# Patient Record
Sex: MALE | Race: WHITE | NOT HISPANIC OR LATINO | Employment: OTHER | URBAN - METROPOLITAN AREA
[De-identification: names, ages, dates, MRNs, and addresses within clinical notes are randomized per-mention and may not be internally consistent; named-entity substitution may affect disease eponyms.]

---

## 2018-05-21 ENCOUNTER — OFFICE VISIT (OUTPATIENT)
Dept: URGENT CARE | Facility: URGENT CARE | Age: 83
End: 2018-05-21
Payer: MEDICARE

## 2018-05-21 ENCOUNTER — ANCILLARY PROCEDURE (OUTPATIENT)
Dept: RADIOLOGY | Facility: URGENT CARE | Age: 83
End: 2018-05-21
Payer: MEDICARE

## 2018-05-21 VITALS
BODY MASS INDEX: 31.78 KG/M2 | WEIGHT: 227 LBS | RESPIRATION RATE: 18 BRPM | SYSTOLIC BLOOD PRESSURE: 137 MMHG | DIASTOLIC BLOOD PRESSURE: 96 MMHG | OXYGEN SATURATION: 96 % | TEMPERATURE: 97.5 F | HEART RATE: 88 BPM | HEIGHT: 71 IN

## 2018-05-21 DIAGNOSIS — M25.532 LEFT WRIST PAIN: Primary | ICD-10-CM

## 2018-05-21 PROCEDURE — 99202 OFFICE O/P NEW SF 15 MIN: CPT | Performed by: NURSE PRACTITIONER

## 2018-05-21 PROCEDURE — 73110 X-RAY EXAM OF WRIST: CPT | Mod: LT,PO,FY

## 2018-05-21 PROCEDURE — 99213 OFFICE O/P EST LOW 20 MIN: CPT | Mod: PO | Performed by: NURSE PRACTITIONER

## 2018-05-21 PROCEDURE — 99202 OFFICE O/P NEW SF 15 MIN: CPT | Mod: 25,PO | Performed by: NURSE PRACTITIONER

## 2018-05-21 RX ORDER — WARFARIN 3 MG/1
TABLET ORAL
COMMUNITY
Start: 2013-08-13

## 2018-05-21 RX ORDER — GABAPENTIN 800 MG/1
TABLET ORAL
COMMUNITY
Start: 2018-05-09

## 2018-05-21 RX ORDER — BENAZEPRIL HYDROCHLORIDE 20 MG/1
40 TABLET ORAL
COMMUNITY
Start: 2018-05-09

## 2018-05-21 RX ORDER — ACETAMINOPHEN 500 MG
TABLET ORAL
COMMUNITY

## 2018-05-21 RX ORDER — METOPROLOL SUCCINATE 50 MG/1
TABLET, EXTENDED RELEASE ORAL
COMMUNITY
Start: 2018-05-09

## 2018-05-21 RX ORDER — CHLORTHALIDONE 25 MG/1
TABLET ORAL
Status: ON HOLD | COMMUNITY
Start: 2018-05-09 | End: 2020-09-04 | Stop reason: ALTCHOICE

## 2018-05-21 RX ORDER — PRAVASTATIN SODIUM 40 MG/1
TABLET ORAL
COMMUNITY
Start: 2018-05-09 | End: 2018-12-11 | Stop reason: ALTCHOICE

## 2018-05-21 RX ORDER — ASPIRIN 81 MG/1
TABLET ORAL
COMMUNITY
Start: 2010-07-15

## 2018-05-21 RX ORDER — OXYBUTYNIN CHLORIDE 5 MG/1
5 TABLET, EXTENDED RELEASE ORAL
COMMUNITY

## 2018-05-21 RX ORDER — ATORVASTATIN CALCIUM 40 MG/1
40 TABLET, FILM COATED ORAL
COMMUNITY

## 2018-05-21 RX ORDER — CLOPIDOGREL BISULFATE 75 MG/1
75 TABLET ORAL
COMMUNITY
Start: 2013-12-20

## 2018-05-21 RX ORDER — POTASSIUM CHLORIDE 1500 MG/1
TABLET, EXTENDED RELEASE ORAL
COMMUNITY
Start: 2018-03-17

## 2018-05-21 ASSESSMENT — ENCOUNTER SYMPTOMS
DIZZINESS: 0
NUMBNESS: 1
CHILLS: 0
FEVER: 0
VOMITING: 0
NAUSEA: 0
WEAKNESS: 0
PALPITATIONS: 0
TREMORS: 1
SHORTNESS OF BREATH: 0

## 2018-05-21 ASSESSMENT — PAIN SCALES - GENERAL: PAINLEVEL: 9

## 2018-05-21 NOTE — PROGRESS NOTES
"Subjective      HPI    Milan Hill is a 83 y.o. male who presents for left wrist and hand pain.  Patient states he was getting up out of his chair fell back and landed on his left hand.  States he noticed swelling and pain immediately.  Denies any weakness      Review of Systems   Constitutional: Negative for chills and fever.   Respiratory: Negative for shortness of breath.    Cardiovascular: Negative for chest pain and palpitations.   Gastrointestinal: Negative for nausea and vomiting.   Musculoskeletal:        Tender and swelling to the left hand   Skin: Negative.    Neurological: Positive for tremors (familial tremors) and numbness (slight to 3rd and 4th left digit). Negative for dizziness and weakness.         Objective   /96 (BP Location: Right arm, Patient Position: Sitting, Cuff Size: Reg)   Pulse 88   Temp 36.4 °C (97.5 °F) (Temporal)   Resp 18   Ht 1.791 m (5' 10.5\")   Wt 103 kg (227 lb)   SpO2 96%   BMI 32.11 kg/m²     Physical Exam   Constitutional: He is oriented to person, place, and time. He appears well-developed and well-nourished. No distress.   HENT:   Head: Normocephalic and atraumatic.   Eyes: Pupils are equal, round, and reactive to light. No scleral icterus.   Neck: Normal range of motion.   Cardiovascular: Normal rate and intact distal pulses.    Pulmonary/Chest: Effort normal. No respiratory distress.   Musculoskeletal: Normal range of motion. He exhibits edema and tenderness (left hand over metacarpals/wrist ulnar and radial side). He exhibits no deformity.   Neurological: He is alert and oriented to person, place, and time. He has normal strength. No sensory deficit. He exhibits normal muscle tone.   Skin: Skin is warm and dry. Capillary refill takes less than 2 seconds. No bruising and no rash noted. He is not diaphoretic.   Psychiatric: He has a normal mood and affect. His behavior is normal. Judgment and thought content normal.   Nursing note and vitals " reviewed.      No results found for this or any previous visit (from the past 4 hour(s)).    Assessment/Plan   Diagnoses and all orders for this visit:    Left wrist pain  -     X-ray wrist 3 or more views left (Normal, Clinic Performed)    X-ray Wrist 3 Or More Views Left (normal, Clinic Performed)    Result Date: 5/21/2018  Exam: 3 views Left Wrist  05/21/2018 Clinical History: Left wrist pain; post injury-ttp and swelling 2-5 metacarpals  wrist: radial/ulna Procedure/Views: 3 views Left Wrist Comparison/s:  None Findings:  No acute fracture or dislocation. Moderate degenerative change of the first carpometacarpal joint. Possibly dorsal swelling.     1. Possible dorsal wrist swelling. No definite acute fracture. 2. 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      Discussion:     Discussed xray results as mentioned above show no fracture.  Encouraged patient to use rest, ice, compression, and elevation for pain and swelling.  Can continue to use NSAIDS and tylenol for discomfort.  Applied Velcro wrist brace to affected area.  Patient is to return to urgent care should they develop fever, worsening pain/symptoms, or any concerns.  Patient verbalized understanding and agrees with plan.  Patient denies any questions or concerns at this time.         Jennifer G Franke, CNP

## 2018-12-11 ENCOUNTER — OFFICE VISIT (OUTPATIENT)
Dept: URGENT CARE | Facility: URGENT CARE | Age: 83
End: 2018-12-11
Payer: MEDICARE

## 2018-12-11 VITALS
HEART RATE: 55 BPM | DIASTOLIC BLOOD PRESSURE: 64 MMHG | TEMPERATURE: 97 F | OXYGEN SATURATION: 98 % | HEIGHT: 71 IN | BODY MASS INDEX: 32.9 KG/M2 | RESPIRATION RATE: 16 BRPM | WEIGHT: 235 LBS | SYSTOLIC BLOOD PRESSURE: 132 MMHG

## 2018-12-11 DIAGNOSIS — K12.1 STOMATITIS: Primary | ICD-10-CM

## 2018-12-11 LAB
BASOPHILS # BLD AUTO: 0.01 10*3/UL
BASOPHILS NFR BLD AUTO: 0 % (ref 0–2)
EOSINOPHIL # BLD AUTO: 0.13 10*3/UL
EOSINOPHIL NFR BLD AUTO: 3 % (ref 0–5)
ERYTHROCYTE [DISTWIDTH] IN BLOOD BY AUTOMATED COUNT: 12.9 % (ref 11.6–14.8)
HCT VFR BLD AUTO: 43.7 % (ref 43.5–53.7)
HGB BLD-MCNC: 14.9 G/DL (ref 14.1–18.1)
LYMPHOCYTES # BLD AUTO: 1.12 10*3/UL
LYMPHOCYTES NFR BLD AUTO: 28 % (ref 13–40)
MCH RBC QN AUTO: 31.2 PG (ref 26–34)
MCHC RBC AUTO-ENTMCNC: 34.2 G/DL (ref 31–36)
MCV RBC AUTO: 91.2 FL (ref 80–100)
MONOCYTES # BLD AUTO: 0.49 10*3/UL
MONOCYTES NFR BLD AUTO: 12 % (ref 2–11)
NEUTROPHILS # BLD AUTO: 2.26 10*3/UL
NEUTROPHILS NFR BLD AUTO: 56 % (ref 47–80)
PLATELET # BLD AUTO: 178 10*3/UL (ref 140–440)
PMV BLD AUTO: 8.5 FL (ref 6.9–10.8)
RBC # BLD AUTO: 4.79 10*6/ΜL (ref 4.69–6.13)
WBC # BLD AUTO: 4 10*3/UL (ref 4.1–10.9)

## 2018-12-11 PROCEDURE — 36415 COLL VENOUS BLD VENIPUNCTURE: CPT | Mod: PO | Performed by: PHYSICIAN ASSISTANT

## 2018-12-11 PROCEDURE — G0463 HOSPITAL OUTPT CLINIC VISIT: HCPCS | Mod: PO | Performed by: PHYSICIAN ASSISTANT

## 2018-12-11 PROCEDURE — 85025 COMPLETE CBC W/AUTO DIFF WBC: CPT | Mod: PO | Performed by: PHYSICIAN ASSISTANT

## 2018-12-11 PROCEDURE — 99212 OFFICE O/P EST SF 10 MIN: CPT | Performed by: PHYSICIAN ASSISTANT

## 2018-12-11 ASSESSMENT — PAIN SCALES - GENERAL: PAINLEVEL: 0-NO PAIN

## 2018-12-11 NOTE — PROGRESS NOTES
"Subjective      Milan Hill is a 84 y.o. male who presents for burning in his mouth. Pt states that this has been going on approximately one month. Reports a burning sensation throughout his mouth including the tongue, sides of cheek, and back of mouth. Peroxide and spicy, hot foods make this worse. He had to change his toothpaste as it was cinnamon flavor and this caused worsening burning. He states that the burning comes and goes and is worse some days than others. He denies new medications. Does not use oral or inhaled corticosteroids. Denies any other significant symptoms. 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    The following have been reviewed and updated as appropriate in this visit:  Past Medical, Surgical and Social History    Allergies   Allergen Reactions   • Percocet [Oxycodone-Acetaminophen] Other (see comments)     \"I go crazy\"     Current Outpatient Medications   Medication Sig Dispense Refill   • aspirin (ECOTRIN LOW STRENGTH) 81 mg EC tablet Take by mouth.     • atorvastatin (LIPITOR) 40 mg tablet Take 40 mg by mouth.     • benazepril (LOTENSIN) 20 mg tablet      • chlorthalidone (HYGROTEN) 25 mg tablet      • cholecalciferol, vitamin D3, 2,000 unit capsule Take by mouth.     • clopidogrel (PLAVIX) 75 mg tablet Take 75 mg by mouth.     • gabapentin (NEURONTIN) 800 mg tablet      • metoprolol succinate XL (TOPROL-XL) 50 mg 24 hr tablet      • oxybutynin XL (DITROPAN-XL) 5 mg 24 hr tablet Take 5 mg by mouth.     • potassium chloride (K-TAB) 20 mEq CR tablet      • warfarin (COUMADIN) 3 mg tablet Take 3 mg on Mondays, Wednesdays and Fridays. Take 1.5 mg on Tuesday, Thursdays, Saturdays and Sundays     • magic mouthwash (maalox/lidocaine/diphenhydramine) Swish and spit 10 mL every 6 (six) hours as needed for stomatitis for up to 5 days 280 mL 0     No current facility-administered medications for this visit.        Review of Systems  As noted above in HPI.     Objective " "  /64 (BP Location: Right arm, Patient Position: Sitting, Cuff Size: Reg) Comment: Manual  Pulse 55   Temp 36.1 °C (97 °F) (Temporal)   Resp 16   Ht 1.791 m (5' 10.5\")   Wt 107 kg (235 lb)   SpO2 98%   BMI 33.24 kg/m²     Physical Exam   Constitutional: He is oriented to person, place, and time. He appears well-developed and well-nourished. No distress.   HENT:   Right Ear: Tympanic membrane, external ear and ear canal normal.   Left Ear: Tympanic membrane, external ear and ear canal normal.   Mouth/Throat: Mucous membranes are dry.   Mucosa of bilateral cheeks with scattered pinpoint areas of erythema. These areas are non-tender to palpation. Fissured tongue. No evidence of candidal infection.    Eyes: Conjunctivae and EOM are normal.   Cardiovascular: Normal rate, regular rhythm and normal heart sounds.   Pulmonary/Chest: Effort normal and breath sounds normal.   Neurological: He is alert and oriented to person, place, and time.   Skin: Skin is warm and dry.   Nursing note and vitals reviewed.      LABS  Recent Results (from the past 2 hour(s))   CBC w/auto differential Blood, Venous    Collection Time: 12/11/18 12:10 PM   Result Value Ref Range    WBC 4.0 (L) 4.1 - 10.9 10*3/uL    RBC 4.79 4.69 - 6.13 10*6/µL    Hemoglobin 14.9 14.1 - 18.1 g/dL    Hematocrit 43.7 43.5 - 53.7 %    MCV 91.2 80.0 - 100.0 fL    MCH 31.2 26.0 - 34.0 pg    MCHC 34.2 31.0 - 36.0 g/dL    RDW 12.9 11.6 - 14.8 %    Platelets 178 140 - 440 10*3/uL    MPV 8.5 6.9 - 10.8 fL    Neutrophils% 56 47 - 80 %    Lymphocytes% 28 13 - 40 %    Monocytes% 12 (H) 2 - 11 %    Eosinophils% 3 0 - 5 %    Basophils% 0 0 - 2 %    Neutrophils Absolute 2.26 10*3/uL    Lymphocytes Absolute 1.12 10*3/uL    Monocytes Absolute 0.49 10*3/uL    Eosinophils Absolute 0.13 10*3/uL    Basophils Absolute 0.01 10*3/uL         Assessment/Plan   Diagnoses and all orders for this visit:    Stomatitis  -     CBC w/auto differential Blood, Venous  -     magic " mouthwash (maalox/lidocaine/diphenhydramine); Swish and spit 10 mL every 6 (six) hours as needed for stomatitis for up to 5 days        I did obtain a CBC to look for anemia. Hgb and Hct WNL. Exam not consistent with herpangina. Question possible xerostomia as cause. Will prescribe magic mouthwash to see if this helps patients discomfort. Advised use of OTC agents to help increase salivation and moisture within the mouth. Follow up with PCP in the next 1 week if symptoms persist for further evaluation. Return to the clinic prn with any worsening or new concerning symptoms.   Patient expresses understanding and agrees with plan of care.     FRANK Lockhart  December 11, 2018 12:38 PM

## 2019-01-22 ENCOUNTER — HOSPITAL ENCOUNTER (OUTPATIENT)
Dept: NUCLEAR MEDICINE | Facility: HOSPITAL | Age: 84
Discharge: HOME | End: 2019-01-22
Payer: MEDICARE

## 2019-01-22 VITALS — WEIGHT: 235 LBS | BODY MASS INDEX: 33.24 KG/M2

## 2019-01-22 DIAGNOSIS — R10.9 ABDOMINAL PAIN: ICD-10-CM

## 2019-01-22 PROCEDURE — 6360000200 HC RX 636 W HCPCS (ALT 250 FOR IP): Performed by: RADIOLOGY

## 2019-01-22 PROCEDURE — A9537 TC99M MEBROFENIN: HCPCS

## 2019-01-22 PROCEDURE — 2580000300 HC RX 258: Performed by: RADIOLOGY

## 2019-01-22 RX ADMIN — SODIUM CHLORIDE 2.14 MCG: 9 INJECTION, SOLUTION INTRAVENOUS at 12:55

## 2019-04-08 ENCOUNTER — ANCILLARY PROCEDURE (OUTPATIENT)
Dept: RADIOLOGY | Facility: URGENT CARE | Age: 84
End: 2019-04-08
Payer: MEDICARE

## 2019-04-08 ENCOUNTER — OFFICE VISIT (OUTPATIENT)
Dept: URGENT CARE | Facility: URGENT CARE | Age: 84
End: 2019-04-08
Payer: MEDICARE

## 2019-04-08 VITALS
HEART RATE: 66 BPM | HEIGHT: 71 IN | RESPIRATION RATE: 20 BRPM | WEIGHT: 239 LBS | OXYGEN SATURATION: 95 % | BODY MASS INDEX: 33.46 KG/M2 | TEMPERATURE: 97.4 F | DIASTOLIC BLOOD PRESSURE: 66 MMHG | SYSTOLIC BLOOD PRESSURE: 118 MMHG

## 2019-04-08 DIAGNOSIS — M79.672 LEFT FOOT PAIN: Primary | ICD-10-CM

## 2019-04-08 DIAGNOSIS — M25.572 ACUTE LEFT ANKLE PAIN: ICD-10-CM

## 2019-04-08 PROCEDURE — 73610 X-RAY EXAM OF ANKLE: CPT | Mod: LT,PO,FY

## 2019-04-08 PROCEDURE — G0463 HOSPITAL OUTPT CLINIC VISIT: HCPCS | Mod: PO | Performed by: NURSE PRACTITIONER

## 2019-04-08 PROCEDURE — 99213 OFFICE O/P EST LOW 20 MIN: CPT | Performed by: NURSE PRACTITIONER

## 2019-04-08 PROCEDURE — 73630 X-RAY EXAM OF FOOT: CPT | Mod: LT,PO,FY

## 2019-04-08 RX ORDER — KETOCONAZOLE 20 MG/ML
SHAMPOO, SUSPENSION TOPICAL
COMMUNITY
Start: 2019-01-04

## 2019-04-08 RX ORDER — OMEPRAZOLE 20 MG/1
CAPSULE, DELAYED RELEASE ORAL
COMMUNITY
Start: 2019-03-08 | End: 2019-04-08 | Stop reason: ALTCHOICE

## 2019-04-08 RX ORDER — KETOCONAZOLE 20 MG/G
CREAM TOPICAL
COMMUNITY
Start: 2019-01-04

## 2019-04-08 ASSESSMENT — ENCOUNTER SYMPTOMS
SINUS PRESSURE: 0
FEVER: 0
SORE THROAT: 0
SINUS PAIN: 0
SHORTNESS OF BREATH: 0
RHINORRHEA: 0
WHEEZING: 0
COUGH: 0
PALPITATIONS: 0
CHILLS: 0

## 2019-04-08 ASSESSMENT — PAIN SCALES - GENERAL: PAINLEVEL: 2

## 2019-04-08 NOTE — PROGRESS NOTES
"Subjective      HPI    Milan Hill is a 84 y.o. male who presents for evaluation of left lateral foot discomfort.  States that it started approximately a week ago.  He has noticed mild swelling to the left mid lateral aspect of his foot and of the ankle which is generalized.  He states pain with walking is 10 out of 10 with rest 2 out of 10.  He denies any injury or trauma to the ankle or foot.  States over the last week or so he has been helping his daughter with a remodeling job in her house.  States that he has been walking up and down fair amount of steps.  He does have chronic peripheral neuropathy has been ongoing for the last 8-10 years.  Denies any personal history of gout.  He does report past history of fracture, several years ago.  Patient does have known peripheral arterial disease and has had stents to bilateral lower extremities, this is been done at the [facility].  States he just had angioplasty done in October 2008 to the left lower extremity.  Does have a known history of A. fib, CHF, hypertension -he is on Coumadin for anticoagulation, beta-blockers and lisinopril.  He denies history of diabetes.      Review of Systems   Constitutional: Negative for chills and fever.   HENT: Negative for ear pain, rhinorrhea, sinus pressure, sinus pain and sore throat.    Respiratory: Negative for cough, shortness of breath and wheezing.    Cardiovascular: Negative for chest pain and palpitations.   Musculoskeletal:        Left mid/lateral foot pain with associated swelling, generalized swelling left ankle.       As noted in HPI.      Objective   /66   Pulse 66   Temp 36.3 °C (97.4 °F)   Resp 20   Ht 1.791 m (5' 10.51\")   Wt 108 kg (239 lb)   SpO2 95%   BMI 33.80 kg/m²     Physical Exam   Constitutional: He is oriented to person, place, and time. He appears well-developed and well-nourished. No distress.   HENT:   Head: Normocephalic.   Right Ear: External ear normal.   Left Ear: External " ear normal.   Mouth/Throat: Oropharynx is clear and moist. No oropharyngeal exudate.   Eyes: Pupils are equal, round, and reactive to light. Conjunctivae and EOM are normal.   Neck: Normal range of motion. Neck supple.   Cardiovascular: Exam reveals no gallop and no friction rub.   No murmur heard.  Slightly irregular rhythm, rate upper 60/lower 70s.  Not able to appreciate murmur, click, rub or gallop.   Pulmonary/Chest: Effort normal and breath sounds normal. No respiratory distress. He has no wheezes. He has no rales. He exhibits no tenderness.   Abdominal: Soft. Bowel sounds are normal. There is no tenderness.   Musculoskeletal:   Left lower extremity: Mild generalized swelling of the ankle and foot, more so mid/lateral foot.  Nontender to palpation over [id number] shaft tibia/fibula; no palpable anterior ankle joint tenderness, crepitus.  Moderate palpable tenderness over mid/proximal aspect of third and fourth metatarsals, nontender over first, second, fifth metatarsals.  Application of tuning fork over mid/proximal third and fourth metatarsal with increased sensitivity compared to remaining metatarsals.  Patient does have some mild decreased sensation to touch.  Pedal pulse 1+/4+.  Skin is warm.  Capillary refill 2-3 seconds.  He is able to ankle dorsiflex and plantarflex without difficulty.  Calf supple-nontender.   Lymphadenopathy:     He has no cervical adenopathy.   Neurological: He is alert and oriented to person, place, and time.   Skin: Skin is warm and dry. Capillary refill takes less than 2 seconds.   Psychiatric: He has a normal mood and affect.   Nursing note and vitals reviewed.      No results found for this or any previous visit (from the past 4 hour(s)).    X-ray Ankle 3 Or More Views Left    Result Date: 4/8/2019  Exam:  Left foot and ankle 6 views total 04/08/2019 Clinical History: Left foot pain; left foot pain - prox to mid shaft 3rd and 4th MTs ? early stress fx. Comparison:  None  Findings: Peroneus brevis enthesopathy. Plantar calcaneal enthesopathy. Dorsal spurring of the mid foot. No acute displaced fracture or dislocation. Remodeled appearance of the medial malleolus compatible with old trauma. No acute displaced fracture is seen. There is hardware at the distal third diaphysis of the tibia, which is incompletely imaged. Moderate soft tissue swelling around the ankle.     Impression: 1.  No acute displaced fracture or dislocation. Moderate soft tissue swelling around the ankle.    X-ray Foot 3 Or More Views Left    Result Date: 4/8/2019  Exam:  Left foot and ankle 6 views total 04/08/2019 Clinical History: Left foot pain; left foot pain - prox to mid shaft 3rd and 4th MTs ? early stress fx. Comparison:  None Findings: Peroneus brevis enthesopathy. Plantar calcaneal enthesopathy. Dorsal spurring of the mid foot. No acute displaced fracture or dislocation. Remodeled appearance of the medial malleolus compatible with old trauma. No acute displaced fracture is seen. There is hardware at the distal third diaphysis of the tibia, which is incompletely imaged. Moderate soft tissue swelling around the ankle.     Impression: 1.  No acute displaced fracture or dislocation. Moderate soft tissue swelling around the ankle.        Assessment/Plan   Diagnoses and all orders for this visit:    Left foot pain  -     X-ray foot 3 or more views left  -     Ambulatory referral to Podiatry; Future    Acute left ankle pain  -     X-ray ankle 3 or more views left        Discussion:   Reviewed exam findings with patient.  Imaging of the left foot and left ankle are obtained.  Imaging does not show any evidence of gross fractures.  However, on exam does have moderate palpable tenderness over the mid/proximal aspect of the third and fourth metatarsals and with increased sensitivity of application of tuning fork over the area in comparison to the remaining metatarsals.  Concern for possible stress fracture.  Patient  denies any history of osteoporosis.  At this time, patient will be placed into a cam boot, states that he has one at home, as well as crutches.  Discussed with him to wear the cam boot with all ambulation he is weightbearing as tolerated.  Elevation and ice to the area as needed for pain/discomfort.  Referral to podiatry for further evaluation and management.  Patient verbalizes understanding and agrees with treatment plan.       Yeni Colindres, CNP

## 2019-04-15 ENCOUNTER — HOSPITAL ENCOUNTER (OUTPATIENT)
Facility: HOSPITAL | Age: 84
Setting detail: OUTPATIENT SURGERY
Discharge: HOME | End: 2019-04-15
Attending: INTERNAL MEDICINE | Admitting: INTERNAL MEDICINE
Payer: MEDICARE

## 2019-04-15 VITALS
WEIGHT: 230 LBS | BODY MASS INDEX: 31.15 KG/M2 | OXYGEN SATURATION: 97 % | SYSTOLIC BLOOD PRESSURE: 128 MMHG | TEMPERATURE: 97.2 F | DIASTOLIC BLOOD PRESSURE: 77 MMHG | HEIGHT: 72 IN | RESPIRATION RATE: 16 BRPM | HEART RATE: 56 BPM

## 2019-04-15 PROCEDURE — (BLANK) HC RECOVERY PHASE-2 1ST 1/2 HOUR ACUITY LEVEL 1: Performed by: INTERNAL MEDICINE

## 2019-04-15 PROCEDURE — (BLANK): Performed by: INTERNAL MEDICINE

## 2019-04-15 RX ORDER — LIDOCAINE HYDROCHLORIDE 20 MG/ML
SOLUTION OROPHARYNGEAL
Status: DISCONTINUED
Start: 2019-04-15 | End: 2019-04-15 | Stop reason: HOSPADM

## 2020-09-01 RX ORDER — ALLOPURINOL 300 MG/1
300 TABLET ORAL DAILY
COMMUNITY

## 2020-09-01 RX ORDER — DULOXETIN HYDROCHLORIDE 30 MG/1
30 CAPSULE, DELAYED RELEASE ORAL DAILY
COMMUNITY

## 2020-09-01 RX ORDER — PREDNISONE 5 MG/1
5 TABLET ORAL EVERY OTHER DAY
COMMUNITY

## 2020-09-01 RX ORDER — OMEPRAZOLE 20 MG/1
20 CAPSULE, DELAYED RELEASE ORAL DAILY
COMMUNITY

## 2020-09-01 RX ORDER — FUROSEMIDE 20 MG/1
20 TABLET ORAL DAILY
COMMUNITY

## 2020-09-01 SDOH — HEALTH STABILITY: MENTAL HEALTH: HOW OFTEN DO YOU HAVE A DRINK CONTAINING ALCOHOL?: MONTHLY OR LESS

## 2020-09-01 SDOH — HEALTH STABILITY: MENTAL HEALTH: HOW OFTEN DO YOU HAVE SIX OR MORE DRINKS ON ONE OCCASION?: NEVER

## 2020-09-01 SDOH — HEALTH STABILITY: MENTAL HEALTH: HOW MANY DRINKS CONTAINING ALCOHOL DO YOU HAVE ON A TYPICAL DAY WHEN YOU ARE DRINKING?: 1 OR 2

## 2020-09-04 ENCOUNTER — HOSPITAL ENCOUNTER (OUTPATIENT)
Facility: HOSPITAL | Age: 85
Setting detail: OUTPATIENT SURGERY
Discharge: HOME | End: 2020-09-04
Attending: INTERNAL MEDICINE | Admitting: INTERNAL MEDICINE
Payer: MEDICARE

## 2020-09-04 ENCOUNTER — ANESTHESIA (OUTPATIENT)
Dept: GASTROENTEROLOGY | Facility: HOSPITAL | Age: 85
End: 2020-09-04
Payer: MEDICARE

## 2020-09-04 ENCOUNTER — ANESTHESIA EVENT (OUTPATIENT)
Dept: GASTROENTEROLOGY | Facility: HOSPITAL | Age: 85
End: 2020-09-04
Payer: MEDICARE

## 2020-09-04 VITALS
RESPIRATION RATE: 12 BRPM | SYSTOLIC BLOOD PRESSURE: 122 MMHG | HEART RATE: 51 BPM | BODY MASS INDEX: 30.14 KG/M2 | OXYGEN SATURATION: 98 % | TEMPERATURE: 97.5 F | WEIGHT: 222.22 LBS | DIASTOLIC BLOOD PRESSURE: 67 MMHG

## 2020-09-04 PROCEDURE — (BLANK): Performed by: INTERNAL MEDICINE

## 2020-09-04 PROCEDURE — 99100 ANES PT EXTEME AGE<1 YR&>70: CPT | Performed by: NURSE ANESTHETIST, CERTIFIED REGISTERED

## 2020-09-04 PROCEDURE — 6360000200 HC RX 636 W HCPCS (ALT 250 FOR IP): Performed by: NURSE ANESTHETIST, CERTIFIED REGISTERED

## 2020-09-04 PROCEDURE — (BLANK) HC MAC ANESTHESIA FACILITY CHARGE EACH ADDITIONAL MIN: Performed by: INTERNAL MEDICINE

## 2020-09-04 PROCEDURE — (BLANK) HC RECOVERY PHASE-2 EACH ADDITIONAL 1/2 HOUR ACUITY LEVEL 1: Performed by: INTERNAL MEDICINE

## 2020-09-04 PROCEDURE — (BLANK) HC MAC ANESTHESIA FACILITY CHARGE 1ST 15 MIN: Performed by: INTERNAL MEDICINE

## 2020-09-04 PROCEDURE — 88305 TISSUE EXAM BY PATHOLOGIST: CPT

## 2020-09-04 PROCEDURE — 2580000300 HC RX 258: Performed by: NURSE ANESTHETIST, CERTIFIED REGISTERED

## 2020-09-04 PROCEDURE — (BLANK) HC RECOVERY PHASE-2 1ST 1/2 HOUR ACUITY LEVEL 1: Performed by: INTERNAL MEDICINE

## 2020-09-04 PROCEDURE — 2500000200 HC RX 250 WO HCPCS: Performed by: NURSE ANESTHETIST, CERTIFIED REGISTERED

## 2020-09-04 PROCEDURE — 00813 ANES UPR LWR GI NDSC PX: CPT | Performed by: NURSE ANESTHETIST, CERTIFIED REGISTERED

## 2020-09-04 RX ORDER — PROPOFOL 10 MG/ML
INJECTION, EMULSION INTRAVENOUS AS NEEDED
Status: DISCONTINUED | OUTPATIENT
Start: 2020-09-04 | End: 2020-09-04 | Stop reason: SURG

## 2020-09-04 RX ORDER — TOPICAL ANESTHETIC 200 MG/ML
SPRAY DENTAL; PERIODONTAL AS NEEDED
Status: DISCONTINUED | OUTPATIENT
Start: 2020-09-04 | End: 2020-09-04 | Stop reason: SURG

## 2020-09-04 RX ORDER — LIDOCAINE HYDROCHLORIDE 20 MG/ML
INJECTION, SOLUTION EPIDURAL; INFILTRATION; INTRACAUDAL; PERINEURAL AS NEEDED
Status: DISCONTINUED | OUTPATIENT
Start: 2020-09-04 | End: 2020-09-04 | Stop reason: SURG

## 2020-09-04 RX ORDER — PROPOFOL 10 MG/ML
INJECTION, EMULSION INTRAVENOUS CONTINUOUS PRN
Status: DISCONTINUED | OUTPATIENT
Start: 2020-09-04 | End: 2020-09-04 | Stop reason: SURG

## 2020-09-04 RX ORDER — SODIUM CHLORIDE, SODIUM LACTATE, POTASSIUM CHLORIDE, CALCIUM CHLORIDE 600; 310; 30; 20 MG/100ML; MG/100ML; MG/100ML; MG/100ML
INJECTION, SOLUTION INTRAVENOUS CONTINUOUS PRN
Status: DISCONTINUED | OUTPATIENT
Start: 2020-09-04 | End: 2020-09-04 | Stop reason: SURG

## 2020-09-04 RX ADMIN — SODIUM CHLORIDE, POTASSIUM CHLORIDE, SODIUM LACTATE AND CALCIUM CHLORIDE: 600; 310; 30; 20 INJECTION, SOLUTION INTRAVENOUS at 08:59

## 2020-09-04 RX ADMIN — TOPICAL ANESTHETIC 1 APPLICATION: 200 SPRAY DENTAL; PERIODONTAL at 09:03

## 2020-09-04 RX ADMIN — PROPOFOL 20 MG: 10 INJECTION, EMULSION INTRAVENOUS at 09:06

## 2020-09-04 RX ADMIN — PROPOFOL 20 MG: 10 INJECTION, EMULSION INTRAVENOUS at 09:08

## 2020-09-04 RX ADMIN — LIDOCAINE HYDROCHLORIDE 100 MG: 20 INJECTION, SOLUTION EPIDURAL; INFILTRATION; INTRACAUDAL; PERINEURAL at 09:06

## 2020-09-04 RX ADMIN — DEXMEDETOMIDINE HYDROCHLORIDE 8 MCG: 4 INJECTION, SOLUTION INTRAVENOUS at 09:06

## 2020-09-04 RX ADMIN — PROPOFOL 150 MCG/KG/MIN: 10 INJECTION, EMULSION INTRAVENOUS at 09:06

## 2020-09-04 ASSESSMENT — ENCOUNTER SYMPTOMS: DYSRHYTHMIAS: 1

## 2020-09-04 NOTE — PERIOPERATIVE NURSING NOTE
Discharge instructions was explained to the pt and his son in-law, all their questions were answered and they verbalized understanding.

## 2020-09-04 NOTE — H&P
HPI  HPI   This very pleasant 85-year-old man presents for surveillance colonoscopy after undergoing colonoscopy in 2008 and [facility] and being advised to follow-up with colonoscopy in 2013.  However, that did not take place.  He also presents for esophagogastroduodenoscopy due to dysphagia.    There is no problem list on file for this patient.    Past Medical History:   Diagnosis Date   • Arthritis    • Cancer (CMS/HCC) (HCC)     skin cancer   • Clotting disorder (CMS/HCC) (HCC)     on anticoagulants   • Coronary artery disease    • Dysrhythmia    • Familial tremor    • Hiatal hernia    • Hypercholesteremia    • Hypertension    • Irregular heart beat    • Peripheral vascular disease (CMS/HCC) (HCC)    • Urinary retention    • Wears dentures    • Wears partial dentures      Prior to Admission medications    Medication Sig Start Date End Date Taking? Authorizing Provider   allopurinoL (ZYLOPRIM) 300 mg tablet Take 300 mg by mouth daily   Yes Historical Provider, MD   omeprazole (PriLOSEC) 20 mg capsule Take 20 mg by mouth daily   Yes Historical Provider, MD   predniSONE 5 mg tablet Take 5 mg by mouth every other day   Yes Historical Provider, MD   furosemide (LASIX) 20 mg tablet Take 20 mg by mouth daily   Yes Historical Provider, MD   DULoxetine (CYMBALTA) 30 mg capsule Take 30 mg by mouth daily   Yes Historical Provider, MD   ketoconazole (NIZORAL) 2 % cream  1/4/19  Yes Historical Provider, MD   ketoconazole (NIZORAL) 2 % shampoo  1/4/19  Yes Historical Provider, MD   aspirin (ECOTRIN LOW STRENGTH) 81 mg EC tablet Take by mouth. 7/15/10  Yes Historical Provider, MD   atorvastatin (LIPITOR) 40 mg tablet Take 40 mg by mouth.   Yes Historical Provider, MD   benazepril (LOTENSIN) 20 mg tablet 40 mg   5/9/18  Yes Historical Provider, MD   cholecalciferol, vitamin D3, 2,000 unit capsule Take by mouth.   Yes Historical Provider, MD   gabapentin (NEURONTIN) 800 mg tablet  5/9/18  Yes Historical Provider, MD   metoprolol  "succinate XL (TOPROL-XL) 50 mg 24 hr tablet  5/9/18  Yes Historical Provider, MD   oxybutynin XL (DITROPAN-XL) 5 mg 24 hr tablet Take 5 mg by mouth.   Yes Historical Provider, MD   clopidogrel (PLAVIX) 75 mg tablet Take 75 mg by mouth. 12/20/13   Historical Provider, MD   potassium chloride (K-TAB) 20 mEq CR tablet  3/17/18   Historical Provider, MD   warfarin (COUMADIN) 3 mg tablet Take 3 mg on Mondays, Wednesdays and Fridays. Take 1.5 mg on Tuesday, Thursdays, Saturdays and Sundays 8/13/13   Historical Provider, MD     Allergies   Allergen Reactions   • Percocet [Oxycodone-Acetaminophen] Other (see comments)     \"I go crazy\"     Social History     Tobacco Use   • Smoking status: Former Smoker     Packs/day: 1.00     Years: 40.00     Pack years: 40.00     Types: Cigarettes   • Smokeless tobacco: Former User     Types: Chew   Substance Use Topics   • Alcohol use: Yes     Frequency: Monthly or less     Drinks per session: 1 or 2     Binge frequency: Never     Family History   Problem Relation Age of Onset   • Heart disease Father      Physical Exam  Well-developed well-nourished man who appears his stated age of 85 years.  Comfortable at rest on room air.  Alert and oriented.  Anicteric.  Mallampati class II pharynx.  Chest is clear to auscultation bilaterally.  Cardiac examination reveals irregularly irregular rhythm consistent with atrial fibrillation.  Normal S1-S2.  No S3-S4.  No murmurs clicks or rubs audible.  Impression/Plan   85-year-old man who presents for esophagogastroduodenoscopy due to dysphagia and surveillance colonoscopy due to history of colon polyps.  Risks benefits indications and alternatives to colonoscopy and esophagogastroduodenoscopy with esophageal dilatation were reviewed in detail with patient informed consent was obtained for the procedures.  Anesthesia Evaluation   The patient is an appropriate candidate for the planned procedure with monitored anesthesia care administered by CRNA.  "

## 2020-09-04 NOTE — ANESTHESIA POSTPROCEDURE EVALUATION
Patient: Milan Hill    Procedure Summary     Date: 09/04/20 Room / Location: [facility] ENDO 03 / [facility]    Anesthesia Start: 0859 Anesthesia Stop: 0959    Procedures:       ESOPHAGOGASTRODUODENOSCOPY with esophageal dilation (N/A Esophagus)      COLONOSCOPY with polypectomy (N/A Anus) Diagnosis:       Dysphagia, unspecified type      (EGD: Dysphagia)      (Colon: Pancolonic diverticulosis, polyp)    Provider: Destin Haro MD Responsible Provider: Destin Haro MD    Anesthesia Type: MAC ASA Status: 3          Anesthesia Type: MAC    Last vitals  Vitals Value Taken Time   BP     Temp     Pulse     Resp     SpO2     Pain Score         Anesthesia Post Evaluation    Patient location during evaluation: bedside  Patient participation: complete - patient participated  Level of consciousness: awake and alert  Pain management: adequate  Airway patency: patent  Anesthetic complications: no  Cardiovascular status: acceptable and hemodynamically stable  Respiratory status: acceptable  Hydration status: acceptable  May dismiss recovered patient based on consultation with the appropriate physicians and/or meeting appropriate discharge criteria      Cosmetic?

## 2020-09-04 NOTE — ANESTHESIA PREPROCEDURE EVALUATION
Pre-Procedure Assessment    Patient: Milan Hill, male, 85 y.o.    Ht Readings from Last 1 Encounters:   04/15/19 1.829 m (6')     Wt Readings from Last 1 Encounters:   04/15/19 104.3 kg (230 lb)       Last Vitals  BP      Temp      Pulse     Resp      SpO2      Pain Score         Problem list reviewed and Medical history reviewed           Airway   Mallampati: I  TM distance: >3 FB  Neck ROM: full      Dental      Pulmonary - negative ROS    breath sounds clear to auscultation  Cardiovascular   (+) hypertension, CAD, dysrhythmias, PVD,     Rhythm: regular  Rate: normal  ROS comment: history of A. fib, CHF, hypertension     Mental Status/Neuro/Psych    Pt is alert.      (+) arthritis,     GI/Hepatic/Renal    (+) hiatal hernia, urinary retention    Comments: Dysphagia, previous dilation    Endo/Other    (+) history of cancer, clotting disorder,   Abdominal           Social History     Tobacco Use   • Smoking status: Former Smoker     Packs/day: 1.00     Years: 40.00     Pack years: 40.00     Types: Cigarettes   • Smokeless tobacco: Former User     Types: Chew   Substance Use Topics   • Alcohol use: Yes     Frequency: Monthly or less     Drinks per session: 1 or 2     Binge frequency: Never      Hematology   WBC   Date Value Ref Range Status   12/11/2018 4.0 (L) 4.1 - 10.9 10*3/uL Final     RBC   Date Value Ref Range Status   12/11/2018 4.79 4.69 - 6.13 10*6/µL Final     MCV   Date Value Ref Range Status   12/11/2018 91.2 80.0 - 100.0 fL Final     Hemoglobin   Date Value Ref Range Status   12/11/2018 14.9 14.1 - 18.1 g/dL Final     Hematocrit   Date Value Ref Range Status   12/11/2018 43.7 43.5 - 53.7 % Final     Platelets   Date Value Ref Range Status   12/11/2018 178 140 - 440 10*3/uL Final      Coagulation No results found for: PT, APTT, INR   General Chemistry No results found for: POCGLU, CALCIUM, BUN, CREATININE, GLUCOSE, NA, K, MG, CO2, CL, DIGOXIN  Anesthesia Plan    ASA 3   NPO status reviewed: > 8  hours    MAC         Induction: intravenous                 Anesthetic plan and risks discussed with patient.  Use of blood products discussed with patient who consented to blood products.     Plan discussed with attending.

## 2020-11-22 ENCOUNTER — APPOINTMENT (OUTPATIENT)
Dept: CT IMAGING | Facility: HOSPITAL | Age: 85
End: 2020-11-22
Payer: MEDICARE

## 2020-11-22 ENCOUNTER — HOSPITAL ENCOUNTER (EMERGENCY)
Facility: HOSPITAL | Age: 85
Discharge: HOME | End: 2020-11-22
Attending: FAMILY MEDICINE
Payer: MEDICARE

## 2020-11-22 VITALS
DIASTOLIC BLOOD PRESSURE: 90 MMHG | RESPIRATION RATE: 16 BRPM | TEMPERATURE: 97.9 F | WEIGHT: 222 LBS | SYSTOLIC BLOOD PRESSURE: 150 MMHG | OXYGEN SATURATION: 95 % | HEIGHT: 72 IN | HEART RATE: 86 BPM | BODY MASS INDEX: 30.07 KG/M2

## 2020-11-22 DIAGNOSIS — U07.1 COVID-19: ICD-10-CM

## 2020-11-22 DIAGNOSIS — U07.1 COVID-19: Primary | ICD-10-CM

## 2020-11-22 LAB
ALBUMIN SERPL-MCNC: 4.1 G/DL (ref 3.5–5.3)
ALP SERPL-CCNC: 83 U/L (ref 45–115)
ALT SERPL-CCNC: 13 U/L (ref 7–52)
ANION GAP SERPL CALC-SCNC: 11 MMOL/L (ref 3–11)
AST SERPL-CCNC: 16 U/L
BACTERIA #/AREA URNS HPF: NORMAL /HPF
BASOPHILS # BLD AUTO: 0 10*3/UL
BASOPHILS NFR BLD AUTO: 1 % (ref 0–2)
BILIRUB SERPL-MCNC: 1.51 MG/DL (ref 0.2–1.4)
BILIRUB UR QL: ABNORMAL
BUN SERPL-MCNC: 14 MG/DL (ref 7–25)
CALCIUM ALBUM COR SERPL-MCNC: 8.7 MG/DL (ref 8.6–10.3)
CALCIUM SERPL-MCNC: 8.8 MG/DL (ref 8.6–10.3)
CHLORIDE SERPL-SCNC: 102 MMOL/L (ref 98–107)
CLARITY UR: CLEAR
CO2 SERPL-SCNC: 21 MMOL/L (ref 21–32)
COLOR UR: YELLOW
CREAT SERPL-MCNC: 1.29 MG/DL (ref 0.7–1.3)
EOSINOPHIL # BLD AUTO: 0 10*3/UL
EOSINOPHIL NFR BLD AUTO: 0 % (ref 0–3)
ERYTHROCYTE [DISTWIDTH] IN BLOOD BY AUTOMATED COUNT: 15.3 % (ref 11.5–15)
GFR SERPL CREATININE-BSD FRML MDRD: 50 ML/MIN/1.73M*2
GLUCOSE SERPL-MCNC: 164 MG/DL (ref 70–105)
GLUCOSE UR QL: NEGATIVE MG/DL
HCT VFR BLD AUTO: 40.5 % (ref 38–50)
HGB BLD-MCNC: 13.4 G/DL (ref 13.2–17.2)
HGB UR QL: NEGATIVE
KETONES UR-MCNC: NEGATIVE MG/DL
LEUKOCYTE ESTERASE UR QL STRIP: NEGATIVE
LIPASE SERPL-CCNC: 13 U/L (ref 11–82)
LYMPHOCYTES # BLD AUTO: 0.3 10*3/UL
LYMPHOCYTES NFR BLD AUTO: 8 % (ref 15–47)
MCH RBC QN AUTO: 30.9 PG (ref 29–34)
MCHC RBC AUTO-ENTMCNC: 33.2 G/DL (ref 32–36)
MCV RBC AUTO: 93.2 FL (ref 82–97)
MONOCYTES # BLD AUTO: 0.6 10*3/UL
MONOCYTES NFR BLD AUTO: 15 % (ref 5–13)
NEUTROPHILS # BLD AUTO: 2.9 10*3/UL
NEUTROPHILS NFR BLD AUTO: 75 % (ref 46–70)
NITRITE UR QL: NEGATIVE
PH UR: 5.5 PH
PLATELET # BLD AUTO: 184 10*3/UL (ref 130–350)
PMV BLD AUTO: 8.8 FL (ref 6.9–10.8)
POTASSIUM SERPL-SCNC: 3.7 MMOL/L (ref 3.5–5.1)
PROT SERPL-MCNC: 7.4 G/DL (ref 6–8.3)
PROT UR STRIP-MCNC: 30 MG/DL
RBC # BLD AUTO: 4.34 10*6/ΜL (ref 4.1–5.8)
RBC #/AREA URNS HPF: NORMAL /HPF
SARS-COV-2 RDRP RESP QL NAA+PROBE: POSITIVE
SODIUM SERPL-SCNC: 134 MMOL/L (ref 135–145)
SP GR UR: 1.01 (ref 1–1.03)
SQUAMOUS #/AREA URNS HPF: NORMAL /HPF
UROBILINOGEN UR-MCNC: 2 E.U./DL
WBC # BLD AUTO: 3.9 10*3/UL (ref 3.7–9.6)
WBC #/AREA URNS HPF: NORMAL /HPF

## 2020-11-22 PROCEDURE — 93010 ELECTROCARDIOGRAM REPORT: CPT | Performed by: FAMILY MEDICINE

## 2020-11-22 PROCEDURE — 87635 SARS-COV-2 COVID-19 AMP PRB: CPT | Performed by: FAMILY MEDICINE

## 2020-11-22 PROCEDURE — 81001 URINALYSIS AUTO W/SCOPE: CPT | Performed by: FAMILY MEDICINE

## 2020-11-22 PROCEDURE — 99284 EMERGENCY DEPT VISIT MOD MDM: CPT | Mod: CS | Performed by: FAMILY MEDICINE

## 2020-11-22 PROCEDURE — 70450 CT HEAD/BRAIN W/O DYE: CPT | Mod: MG

## 2020-11-22 PROCEDURE — 36415 COLL VENOUS BLD VENIPUNCTURE: CPT | Performed by: FAMILY MEDICINE

## 2020-11-22 PROCEDURE — 93005 ELECTROCARDIOGRAM TRACING: CPT | Performed by: FAMILY MEDICINE

## 2020-11-22 PROCEDURE — C9803 HOPD COVID-19 SPEC COLLECT: HCPCS | Performed by: FAMILY MEDICINE

## 2020-11-22 PROCEDURE — 85025 COMPLETE CBC W/AUTO DIFF WBC: CPT | Performed by: FAMILY MEDICINE

## 2020-11-22 PROCEDURE — 83690 ASSAY OF LIPASE: CPT | Performed by: FAMILY MEDICINE

## 2020-11-22 PROCEDURE — 99284 EMERGENCY DEPT VISIT MOD MDM: CPT | Mod: 25 | Performed by: FAMILY MEDICINE

## 2020-11-22 PROCEDURE — 80053 COMPREHEN METABOLIC PANEL: CPT | Performed by: FAMILY MEDICINE

## 2020-11-22 RX ORDER — AMLODIPINE BESYLATE 10 MG/1
10 TABLET ORAL DAILY
COMMUNITY

## 2020-11-22 ASSESSMENT — ENCOUNTER SYMPTOMS
COUGH: 0
NECK PAIN: 0
SHORTNESS OF BREATH: 0
ABDOMINAL PAIN: 0
NUMBNESS: 0
HEADACHES: 0
PALPITATIONS: 0
DYSURIA: 0
CHILLS: 0
VOMITING: 0
WEAKNESS: 0
BACK PAIN: 0
SPEECH DIFFICULTY: 0
FEVER: 0
FREQUENCY: 1
CONFUSION: 1
DIARRHEA: 0

## 2020-11-22 NOTE — DISCHARGE INSTRUCTIONS
Rest.  Please follow-up with primary care physician if symptoms persist and return back to the emergency room if symptoms worsen.

## 2020-11-22 NOTE — ED PROVIDER NOTES
HPI:  Chief Complaint   Patient presents with   • Altered Mental Status     patient woke up this morning feeling confused and feels like he couldn't get his thoughts straight. patient reports he is currently feeling better.   • Urinary Frequency     patient reports he was up all night having to urinate. patient denies pain with urination or abdominal pain       The patient is a very pleasant 86-year-old male brought in by his daughter for evaluation of confusion.  The patient reports he was trying to find a TV show today and was having difficulty which he is never had difficulty before in the past prompting his daughter to bring emergency room for further evaluation.  The patient reports he had a little bit of unsteady gait last night however this is resolved.  Patient reports he normally urinates approximately 7-8 times per night however urinated approximately 15 times which is unusual.  He denies any dysuria or urgency.  Patient denies abdominal pain.  No vomiting diarrhea.  No visual changes.  No slurred speech.  No weakness or numbness of upper or lower extremities.  Patient denies any chest pain, shortness of breath or cough.  No palpitations.          HISTORY:  Past Medical History:   Diagnosis Date   • Arthritis    • Cancer (CMS/HCC) (HCC)     skin cancer   • Clotting disorder (CMS/HCC) (HCC)     on anticoagulants   • Coronary artery disease    • Dysrhythmia    • Familial tremor    • Hiatal hernia    • Hypercholesteremia    • Hypertension    • Irregular heart beat    • Peripheral vascular disease (CMS/HCC) (HCC)    • Urinary retention    • Wears dentures    • Wears partial dentures        Past Surgical History:   Procedure Laterality Date   • COLONOSCOPY N/A 9/4/2020    Procedure: COLONOSCOPY with polypectomy;  Surgeon: Destin Haro MD;  Location: [facility];  Service: Endoscopy;  Laterality: N/A;   • DENTAL SURGERY     • ESOPHAGEAL MOTILITY STUDY N/A 4/15/2019    Procedure: ESOPHAGEAL MOTILITY  STUDY/MANOMETRY;  Surgeon: Destin Haro MD;  Location: [facility];  Service: Endoscopy;  Laterality: N/A;   • ESOPHAGOGASTRODUODENOSCOPY N/A 9/4/2020    Procedure: ESOPHAGOGASTRODUODENOSCOPY with esophageal dilation;  Surgeon: Destin Haro MD;  Location: [facility];  Service: Endoscopy;  Laterality: N/A;   • JOINT REPLACEMENT Right     knee   • KNEE SURGERY     • LEG SURGERY         Family History   Problem Relation Age of Onset   • Heart disease Father        Social History     Tobacco Use   • Smoking status: Former Smoker     Packs/day: 1.00     Years: 40.00     Pack years: 40.00     Types: Cigarettes   • Smokeless tobacco: Former User     Types: Chew   Substance Use Topics   • Alcohol use: Yes     Frequency: Monthly or less     Drinks per session: 1 or 2     Binge frequency: Never   • Drug use: Never         ROS:  Review of Systems   Constitutional: Negative for chills and fever.   Eyes: Negative for visual disturbance.   Respiratory: Negative for cough and shortness of breath.    Cardiovascular: Negative for chest pain and palpitations.   Gastrointestinal: Negative for abdominal pain, diarrhea and vomiting.   Genitourinary: Positive for frequency. Negative for dysuria.   Musculoskeletal: Negative for back pain and neck pain.   Skin: Negative for rash.   Neurological: Negative for speech difficulty, weakness, numbness and headaches.   Psychiatric/Behavioral: Positive for confusion.       PE:  ED Triage Vitals [11/22/20 1134]   Temp Heart Rate Resp BP SpO2   36.6 °C (97.9 °F) 86 16 150/90 90 %      Temp Source Heart Rate Source Patient Position BP Location FiO2 (%)   Temporal -- -- -- --       Physical Exam  Vitals signs reviewed.   Constitutional:       Appearance: He is well-developed.   HENT:      Head: Normocephalic and atraumatic.   Eyes:      General: No visual field deficit.  Cardiovascular:      Rate and Rhythm: Normal rate and regular rhythm.   Pulmonary:      Effort: Pulmonary effort is normal.       Breath sounds: Normal breath sounds.   Abdominal:      Tenderness: There is no abdominal tenderness.   Skin:     Findings: No rash.   Neurological:      Mental Status: He is alert and oriented to person, place, and time.      GCS: GCS eye subscore is 4. GCS verbal subscore is 5. GCS motor subscore is 6.      Cranial Nerves: No cranial nerve deficit, dysarthria or facial asymmetry.      Sensory: No sensory deficit.      Motor: No weakness.      Coordination: Coordination normal.      Gait: Gait normal.   Psychiatric:         Mood and Affect: Mood normal.         Behavior: Behavior normal.         ED LABS:  Labs Reviewed   COVID-19 MOLECULAR (PCR) - Abnormal       Result Value    COVID-19 PCR Positive (*)     Narrative:     Positive results are indicative of the presence of SARS-CoV-2 RNA; clinical correlation with patient history and other diagnostic information is necessary to determine patient infection status.    Performance of this SARS-CoV-2 RNA test has only been established in individuals suspected of having COVID-19 by their healthcare provider.    Testing was performed using the ID NOW COVID-19 assay ([employer]) that detects the SARS coronavirus 2 RdRp gene utilizing isothermal nucleic acid amplification.   Fact sheets for this Emergency Use Authorization (EUA) assay can be found at the following links:  For Healthcare Providers  https://www.fda.gov/media/556211/download  For Patients  https://www.fda.gov/media/712559/download   URINALYSIS DIPSTICK REFLEX TO CULTURE FOR USE WITH MICROSCOPIC PANEL - Abnormal    Color, Urine Yellow      Clarity, Urine Clear      pH, Urine 5.5      Specific Gravity, Urine 1.015      Protein, Urine 30  (*)     Glucose, Urine Negative      Ketones, Urine Negative      Blood, Urine Negative      Nitrite, Urine Negative      Bilirubin, Urine Small (*)     Leukocytes, Urine Negative      Urobilinogen, Urine 2.0 (*)     Narrative:     CONFIRMATORY URINE TESTING (ICTOTEST) UNAVAILABLE,  RECOMMEND SERUM BILIRUBIN IF INDICATED.   CBC WITH AUTO DIFFERENTIAL - Abnormal    WBC 3.9      RBC 4.34      Hemoglobin 13.4      Hematocrit 40.5      MCV 93.2      MCH 30.9      MCHC 33.2      RDW 15.3 (*)     Platelets 184      MPV 8.8      Neutrophils% 75 (*)     Lymphocytes% 8 (*)     Monocytes% 15 (*)     Eosinophils% 0      Basophils% 1      Neutrophils Absolute 2.90      Lymphocytes Absolute 0.30      Monocytes Absolute 0.60      Eosinophils Absolute 0.00      Basophils Absolute 0.00     COMPREHENSIVE METABOLIC PANEL - Abnormal    Sodium 134 (*)     Potassium 3.7      Chloride 102      CO2 21      Anion Gap 11      BUN 14      Creatinine 1.29      Glucose 164 (*)     Calcium 8.8      AST 16      ALT (SGPT) 13      Alkaline Phosphatase 83      Total Protein 7.4      Albumin 4.1      Total Bilirubin 1.51 (*)     eGFR 50 (*)     Corrected Calcium 8.7      Narrative:     Estimated GFR calculated using the 2009 CKD-EPI creatinine equation.   URINALYSIS MICROSCOPIC, REFLEX CULTURE - Normal    RBC, Urine 0-2      WBC, Urine None seen      Squamous Epithelial, Urine None seen      Bacteria, Urine None seen     LIPASE - Normal    Lipase 13     URINALYSIS WITH MICROSCOPIC, REFLEX CULTURE    Narrative:     The following orders were created for panel order Urinalysis w/microscopic, reflex culture Urine, Clean Catch.  Procedure                               Abnormality         Status                     ---------                               -----------         ------                     Urinalysis, Dip (part 1 o...[[id number]]  Abnormal            Final result               Urinalysis, Micro (part 2...[[id number]]  Normal              Final result                 Please view results for these tests on the individual orders.         ED IMAGES:  CT head without IV contrast    (Results Pending)       ED PROCEDURES:  Procedures    ED COURSE:   The patient is a very pleasant 86-year-old male who presents emergency room with his  daughter for evaluation of confusion.  Patient has normal white blood cell count of 3.9 and normal hemoglobin of 13.4.  Sodium is slightly low 134.  Glucose slightly elevated 164.  Urinalysis is unremarkable.  EKG did show atrial fibrillation with a rate of 63.  I am unable to compare this to prior EKGs as it is not in the system however the patient states he does have this type arrhythmia.  Covid was positive.  CT scan of the head -chronic age-related changes without evidence of acute intracranial pathology was noted.  This information was passed on the patient.  Patient remained stable here in the emergency room and oxygen saturations were 92% or greater here during his stay.  Patient will be discharged back home with Covid  entered in epic and will follow up with his primary care physician if needed.         MDM:  MDM  Number of Diagnoses or Management Options     Amount and/or Complexity of Data Reviewed  Clinical lab tests: reviewed and ordered  Tests in the radiology section of CPT®: ordered and reviewed  Tests in the medicine section of CPT®: reviewed and ordered  Review and summarize past medical records: yes    Risk of Complications, Morbidity, and/or Mortality  Presenting problems: moderate  Diagnostic procedures: high  Management options: moderate    Patient Progress  Patient progress: stable      Final diagnoses:   [U07.1] COVID-19   [U07.1] COVID-19        Choco Page MD  11/22/20 0525

## 2020-11-23 ENCOUNTER — HOME MONITORING (OUTPATIENT)
Dept: FAMILY MEDICINE | Facility: CLINIC | Age: 85
End: 2020-11-23

## 2020-11-23 PROCEDURE — G1004 CDSM NDSC: HCPCS | Performed by: RADIOLOGY

## 2020-11-23 PROCEDURE — 70450 CT HEAD/BRAIN W/O DYE: CPT | Mod: 26,MG | Performed by: RADIOLOGY

## 2020-11-23 NOTE — PROGRESS NOTES
Called patient and offer Covid Care Companion, patient feels like he is doing fine and has good family support.  Declined need of furthing monitoring.

## 2021-02-03 ENCOUNTER — CLINICAL SUPPORT (OUTPATIENT)
Dept: FAMILY MEDICINE | Facility: CLINIC | Age: 86
End: 2021-02-03

## 2021-02-03 DIAGNOSIS — Z23 ENCOUNTER FOR VACCINATION: Primary | ICD-10-CM

## 2021-02-24 ENCOUNTER — CLINICAL SUPPORT (OUTPATIENT)
Dept: FAMILY MEDICINE | Facility: CLINIC | Age: 86
End: 2021-02-24

## 2021-02-24 DIAGNOSIS — Z23 ENCOUNTER FOR VACCINATION: Primary | ICD-10-CM

## 2021-10-06 ENCOUNTER — CLINICAL SUPPORT (OUTPATIENT)
Dept: FAMILY MEDICINE | Facility: CLINIC | Age: 86
End: 2021-10-06
Payer: MEDICARE

## 2021-10-06 DIAGNOSIS — Z23 ENCOUNTER FOR VACCINATION: Primary | ICD-10-CM

## 2021-10-06 PROCEDURE — 91300 PFIZER-BIONTECH SARS-COV-2 VACCINE: CPT | Mod: PO | Performed by: NURSE PRACTITIONER

## 2023-01-01 ENCOUNTER — OFFICE VISIT (OUTPATIENT)
Dept: URGENT CARE | Facility: URGENT CARE | Age: 88
End: 2023-01-01
Payer: MEDICARE

## 2023-01-01 ENCOUNTER — ANCILLARY PROCEDURE (OUTPATIENT)
Dept: RADIOLOGY | Facility: URGENT CARE | Age: 88
End: 2023-01-01
Payer: MEDICARE

## 2023-01-01 VITALS
HEART RATE: 55 BPM | OXYGEN SATURATION: 94 % | HEIGHT: 72 IN | WEIGHT: 196.5 LBS | DIASTOLIC BLOOD PRESSURE: 58 MMHG | TEMPERATURE: 98 F | SYSTOLIC BLOOD PRESSURE: 102 MMHG | RESPIRATION RATE: 16 BRPM | BODY MASS INDEX: 26.61 KG/M2

## 2023-01-01 DIAGNOSIS — I50.43 CHF (CONGESTIVE HEART FAILURE), NYHA CLASS I, ACUTE ON CHRONIC, COMBINED (CMS/HCC): ICD-10-CM

## 2023-01-01 DIAGNOSIS — R05.9 COUGH: ICD-10-CM

## 2023-01-01 DIAGNOSIS — I95.9 HYPOTENSION, UNSPECIFIED HYPOTENSION TYPE: ICD-10-CM

## 2023-01-01 DIAGNOSIS — R07.89 CHEST WALL PAIN: Primary | ICD-10-CM

## 2023-01-01 LAB
ACTIVATED PARTIAL THROMBOPLASTIN TIME IN PPP BY COAGULATION ASSAY: 30 SEC (ref 26–39)
ALANINE AMINOTRANSFERASE (SGPT) (U/L) IN SER/PLAS: 6 U/L (ref 10–52)
ALBUMIN (G/DL) IN SER/PLAS: 3.8 G/DL (ref 3.4–5)
ALBUMIN (G/DL) IN SER/PLAS: 3.9 G/DL (ref 3.4–5)
ALKALINE PHOSPHATASE (U/L) IN SER/PLAS: 92 U/L (ref 33–136)
ANION GAP IN SER/PLAS: 11 MMOL/L (ref 10–20)
ANION GAP IN SER/PLAS: 12 MMOL/L (ref 10–20)
ASPARTATE AMINOTRANSFERASE (SGOT) (U/L) IN SER/PLAS: 13 U/L (ref 9–39)
BILIRUBIN TOTAL (MG/DL) IN SER/PLAS: 1.2 MG/DL (ref 0–1.2)
CALCIUM (MG/DL) IN SER/PLAS: 8.8 MG/DL (ref 8.6–10.6)
CALCIUM (MG/DL) IN SER/PLAS: 9 MG/DL (ref 8.6–10.6)
CARBON DIOXIDE, TOTAL (MMOL/L) IN SER/PLAS: 27 MMOL/L (ref 21–32)
CARBON DIOXIDE, TOTAL (MMOL/L) IN SER/PLAS: 30 MMOL/L (ref 21–32)
CHLORIDE (MMOL/L) IN SER/PLAS: 103 MMOL/L (ref 98–107)
CHLORIDE (MMOL/L) IN SER/PLAS: 106 MMOL/L (ref 98–107)
CREATININE (MG/DL) IN SER/PLAS: 1.18 MG/DL (ref 0.5–1.3)
CREATININE (MG/DL) IN SER/PLAS: 1.29 MG/DL (ref 0.5–1.3)
ERYTHROCYTE DISTRIBUTION WIDTH (RATIO) BY AUTOMATED COUNT: 19.9 % (ref 11.5–14.5)
ERYTHROCYTE DISTRIBUTION WIDTH (RATIO) BY AUTOMATED COUNT: 20.3 % (ref 11.5–14.5)
ERYTHROCYTE MEAN CORPUSCULAR HEMOGLOBIN CONCENTRATION (G/DL) BY AUTOMATED: 28.7 G/DL (ref 32–36)
ERYTHROCYTE MEAN CORPUSCULAR HEMOGLOBIN CONCENTRATION (G/DL) BY AUTOMATED: 29 G/DL (ref 32–36)
ERYTHROCYTE MEAN CORPUSCULAR VOLUME (FL) BY AUTOMATED COUNT: 84 FL (ref 80–100)
ERYTHROCYTE MEAN CORPUSCULAR VOLUME (FL) BY AUTOMATED COUNT: 91 FL (ref 80–100)
ERYTHROCYTES (10*6/UL) IN BLOOD BY AUTOMATED COUNT: 3.42 X10E12/L (ref 4.5–5.9)
ERYTHROCYTES (10*6/UL) IN BLOOD BY AUTOMATED COUNT: 4.56 X10E12/L (ref 4.5–5.9)
GFR MALE: 53 ML/MIN/1.73M2
GFR MALE: 60 ML/MIN/1.73M2
GLUCOSE (MG/DL) IN SER/PLAS: 116 MG/DL (ref 74–99)
GLUCOSE (MG/DL) IN SER/PLAS: 86 MG/DL (ref 74–99)
HEMATOCRIT (%) IN BLOOD BY AUTOMATED COUNT: 31 % (ref 41–52)
HEMATOCRIT (%) IN BLOOD BY AUTOMATED COUNT: 38.3 % (ref 41–52)
HEMOGLOBIN (G/DL) IN BLOOD: 11.1 G/DL (ref 13.5–17.5)
HEMOGLOBIN (G/DL) IN BLOOD: 8.9 G/DL (ref 13.5–17.5)
INR IN PPP BY COAGULATION ASSAY: 2.1 (ref 0.9–1.1)
LEUKOCYTES (10*3/UL) IN BLOOD BY AUTOMATED COUNT: 2.9 X10E9/L (ref 4.4–11.3)
LEUKOCYTES (10*3/UL) IN BLOOD BY AUTOMATED COUNT: 3.5 X10E9/L (ref 4.4–11.3)
NATRIURETIC PEPTIDE B (PG/ML) IN SER/PLAS: 274 PG/ML (ref 0–99)
NRBC (PER 100 WBCS) BY AUTOMATED COUNT: 0 /100 WBC (ref 0–0)
NRBC (PER 100 WBCS) BY AUTOMATED COUNT: 0 /100 WBC (ref 0–0)
PHOSPHATE (MG/DL) IN SER/PLAS: 3.5 MG/DL (ref 2.5–4.9)
PLATELETS (10*3/UL) IN BLOOD AUTOMATED COUNT: 168 X10E9/L (ref 150–450)
PLATELETS (10*3/UL) IN BLOOD AUTOMATED COUNT: 236 X10E9/L (ref 150–450)
POTASSIUM (MMOL/L) IN SER/PLAS: 4.1 MMOL/L (ref 3.5–5.3)
POTASSIUM (MMOL/L) IN SER/PLAS: 4.2 MMOL/L (ref 3.5–5.3)
PROTEIN TOTAL: 6.8 G/DL (ref 6.4–8.2)
PROTHROMBIN TIME (PT) IN PPP BY COAGULATION ASSAY: 24.8 SEC (ref 9.8–13.4)
SODIUM (MMOL/L) IN SER/PLAS: 140 MMOL/L (ref 136–145)
SODIUM (MMOL/L) IN SER/PLAS: 141 MMOL/L (ref 136–145)
UREA NITROGEN (MG/DL) IN SER/PLAS: 18 MG/DL (ref 6–23)
UREA NITROGEN (MG/DL) IN SER/PLAS: 20 MG/DL (ref 6–23)

## 2023-01-01 PROCEDURE — G0463 HOSPITAL OUTPT CLINIC VISIT: HCPCS | Mod: PO | Performed by: PHYSICIAN ASSISTANT

## 2023-01-01 PROCEDURE — 99203 OFFICE O/P NEW LOW 30 MIN: CPT | Performed by: PHYSICIAN ASSISTANT

## 2023-01-01 PROCEDURE — 71101 X-RAY EXAM UNILAT RIBS/CHEST: CPT | Mod: LT,PO

## 2023-01-01 ASSESSMENT — PAIN SCALES - GENERAL: PAINLEVEL: 5

## 2023-01-06 NOTE — PROGRESS NOTES
Urgent Care Visit Note    Subjective   Chief Complaint  Chief Complaint   Patient presents with    Fall     Patient fell on some timber 4 days ago, landed on his left side.  He C/O left side rib & upper abdominal pain.  He is not feeling better and it's getting harder to get up and down, out of bed.        History of Present Illness  Milan Hill is a 88 y.o. male presenting for 4-day onset of left anterolateral rib pain.  Patient reports walking outdoors and had mechanical fall landing on his left side.  Since event he has had persistent left rib pain.  He does endorse some minor cough but denies any increasing cough or cough or sputum production.  He endorses ability to obtain full respirations with no pain with deep inspiration.  He does have moderate exacerbation of pain with palpation along with movement.  Reports that he is having significant aspiration of pain when getting out of bed in the morning time and going from a sitting to a standing position.  No medications taken prior to arrival.  No other complaints at this time.    Review of Systems  Pertinent positives and negatives as per the HPI    Past Medical History:   Diagnosis Date    Arthritis     Cancer (CMS/HCC) (HCC)     skin cancer    Clotting disorder (CMS/HCC) (HCC)     on anticoagulants    Coronary artery disease     Dysrhythmia     Familial tremor     Hiatal hernia     Hypercholesteremia     Hypertension     Irregular heart beat     Peripheral vascular disease (CMS/HCC) (HCC)     Urinary retention     Wears dentures     Wears partial dentures          Current Outpatient Medications:     amLODIPine (NORVASC) 10 mg tablet, Take 10 mg by mouth daily, Disp: , Rfl:     allopurinoL (ZYLOPRIM) 300 mg tablet, Take 300 mg by mouth daily, Disp: , Rfl:     omeprazole (PriLOSEC) 20 mg capsule, Take 20 mg by mouth daily, Disp: , Rfl:     predniSONE 5 mg tablet, Take 5 mg by mouth every other day, Disp: , Rfl:     furosemide (LASIX) 20 mg tablet, Take 20 mg  by mouth daily, Disp: , Rfl:     DULoxetine (CYMBALTA) 30 mg capsule, Take 30 mg by mouth daily, Disp: , Rfl:     ketoconazole (NIZORAL) 2 % cream, , Disp: , Rfl:     ketoconazole (NIZORAL) 2 % shampoo, , Disp: , Rfl:     aspirin (ECOTRIN LOW STRENGTH) 81 mg EC tablet, Take by mouth., Disp: , Rfl:     atorvastatin (LIPITOR) 40 mg tablet, Take 40 mg by mouth., Disp: , Rfl:     benazepril (LOTENSIN) 20 mg tablet, 40 mg  , Disp: , Rfl:     cholecalciferol, vitamin D3, 2,000 unit capsule, Take by mouth., Disp: , Rfl:     clopidogrel (PLAVIX) 75 mg tablet, Take 75 mg by mouth., Disp: , Rfl:     gabapentin (NEURONTIN) 800 mg tablet, , Disp: , Rfl:     metoprolol succinate XL (TOPROL-XL) 50 mg 24 hr tablet, , Disp: , Rfl:     oxybutynin XL (DITROPAN-XL) 5 mg 24 hr tablet, Take 5 mg by mouth., Disp: , Rfl:     potassium chloride (K-TAB) 20 mEq CR tablet, , Disp: , Rfl:     warfarin (COUMADIN) 3 mg tablet, Take 3 mg on Mondays, Wednesdays and Fridays. Take 1.5 mg on Tuesday, Thursdays, Saturdays and Sundays, Disp: , Rfl:     Objective   Vital Signs  Vitals:    01/06/23 1059 01/06/23 1218   BP: 90/42  Comment: manual 102/58   Temp: 36.7 °C (98 °F)    TempSrc: Temporal    Pulse: 55 55   Resp: 16    SpO2: 92% 94%   Height: 1.829 m (6')    Weight: 89.1 kg (196 lb 8 oz)    PainSc:   5    PainLoc: Rib Cage    Pain Edu?: Yes    Patient Position: Sitting         Physical Exam  Constitutional:       General: He is not in acute distress.     Appearance: Normal appearance.   HENT:      Head: Normocephalic and atraumatic.      Mouth/Throat:      Mouth: Mucous membranes are moist.   Eyes:      Conjunctiva/sclera: Conjunctivae normal.   Cardiovascular:      Rate and Rhythm: Normal rate and regular rhythm.      Heart sounds: Normal heart sounds. No murmur heard.    No friction rub.   Pulmonary:      Effort: Pulmonary effort is normal.      Breath sounds: Normal breath sounds. No wheezing, rhonchi or rales.   Chest:      Chest wall:  Tenderness (Reproducible left anterolateral chest wall pain.  No paradoxical rib movements.  No flail chest.  No crepitus.) present.   Abdominal:      General: Abdomen is flat.      Palpations: Abdomen is soft.      Tenderness: There is no abdominal tenderness. There is no guarding or rebound.   Musculoskeletal:         General: Normal range of motion.      Cervical back: Normal range of motion.   Skin:     General: Skin is warm and dry.   Neurological:      General: No focal deficit present.      Mental Status: He is alert and oriented to person, place, and time.   Psychiatric:         Mood and Affect: Mood normal.       Lab Review  No results found for this or any previous visit (from the past 48 hour(s)).     Radiology Review  X-ray ribs with PA chest left    Result Date: 1/6/2023  EXAM: DX RIBS LEFT W PA CHEST DATE: 1/6/2023 12:04 PM INDICATION: Chest wall pain; Rib pain COMPARISON: None available. Procedure/Views: Left ribs 2 views with single view chest. Findings: The heart is not enlarged. Aortic atherosclerosis. Clear lungs. No pleural effusion or pneumothorax. No displaced rib fractures. IMPRESSION No acute disease. No displaced rib fractures.        Assessment/Plan   Diagnoses and all orders for this visit:    Chest wall pain  -     X-ray ribs with PA chest left    Hypotension, unspecified hypotension type       1.  Chest wall pain  - Exam reveals no significant abnormalities.  Moderate tenderness palpation.  No crepitus, paradoxical rib movements or flail chest.  - X-ray of chest/left ribs shows no evidence of fracture.  - No pneumonia or other acute abnormality noted.  - Discussed consideration for incentive spirometer due to concern of possible onset of pneumonia.  After discussion, patient reports being able to complete full respirations without complication.  He would not like incentive spirometer at this time.  Therefore no incentive spirometer device sent home with patient at time of  discharge.  -Advised to use Tylenol for pain control as necessary.    2.  Hypotension  - Patient noted to originally have blood pressure at 90/42.  - He was observed here in clinic.  Blood pressure seemed to improve while here in clinic getting as high as 102/58.  -Expressed concern for hypotension here in clinic.  He denies any severe lightheadedness, dizziness, chest pain or shortness of breath.  - Discussed consideration for IV fluids, patient would like to hold off on fluids at this time.  -Patient noted to take multiple hypertension medications.  I strongly encourage patient to contact his primary care provider immediately to discuss blood pressure medications and determine if holding BP medications is reasonable.  -Patient advised to seek immediate medical evaluation for any onset of lightheadedness, dizziness, chest pain or shortness of breath or any other symptoms of concern.  Patient verbalizes full understanding of current plan, agrees to plan.    Patient Education: Ready to learn, no apparent learning barriers were identified; learning preference includes listening.  Explained diagnosis and treatment plan; patient/caregiver expressed understanding of the content. All questions answered.       Jayme Echevarria PA-C  1/6/2023

## 2023-01-06 NOTE — PATIENT INSTRUCTIONS
1.  Rib pain  - X-ray shows no evidence of rib fracture  - X-ray also shows no evidence of pneumonia  - Plan will be for continued symptomatic support.  Tylenol for pain control as necessary  -Please seek immediate medical valuation for any onset of increasing cough, shortness of breath, lightheadedness/dizziness or other symptoms of concern

## 2023-04-28 ENCOUNTER — APPOINTMENT (OUTPATIENT)
Dept: PRIMARY CARE | Facility: CLINIC | Age: 89
End: 2023-04-28
Payer: MEDICARE

## 2023-05-01 ENCOUNTER — APPOINTMENT (OUTPATIENT)
Dept: PRIMARY CARE | Facility: CLINIC | Age: 89
End: 2023-05-01
Payer: MEDICARE

## (undated) DEVICE — TRAP POLYP E TRAP

## (undated) DEVICE — MASK CURAPLEX POM OXYGEN PAMORAMIC ELITE

## (undated) DEVICE — SNARE EXACTO COLD 9MM

## (undated) DEVICE — CAP MALE/FEMALE LUER LOCK ALARIS DEADENDER

## (undated) DEVICE — KIT ENDO PROCEDURE CARRY ON

## (undated) DEVICE — SUCTION YANKAUER BULB TIP W 1 PIECE HANDLE

## (undated) DEVICE — TUBING SUCTION 3/16"X10'